# Patient Record
Sex: FEMALE | Race: WHITE | NOT HISPANIC OR LATINO | Employment: FULL TIME | URBAN - METROPOLITAN AREA
[De-identification: names, ages, dates, MRNs, and addresses within clinical notes are randomized per-mention and may not be internally consistent; named-entity substitution may affect disease eponyms.]

---

## 2017-07-30 DIAGNOSIS — R06.02 SOB (SHORTNESS OF BREATH): ICD-10-CM

## 2017-07-31 LAB — EKG IMPRESSION: NORMAL

## 2018-11-28 ENCOUNTER — HOSPITAL ENCOUNTER (EMERGENCY)
Facility: MEDICAL CENTER | Age: 48
End: 2018-11-28
Attending: EMERGENCY MEDICINE
Payer: COMMERCIAL

## 2018-11-28 VITALS
BODY MASS INDEX: 36.88 KG/M2 | RESPIRATION RATE: 18 BRPM | HEIGHT: 65 IN | SYSTOLIC BLOOD PRESSURE: 140 MMHG | DIASTOLIC BLOOD PRESSURE: 102 MMHG | WEIGHT: 221.34 LBS | TEMPERATURE: 98.3 F | OXYGEN SATURATION: 98 % | HEART RATE: 98 BPM

## 2018-11-28 DIAGNOSIS — J98.01 COUGH DUE TO BRONCHOSPASM: ICD-10-CM

## 2018-11-28 DIAGNOSIS — Z76.0 MEDICATION REFILL: ICD-10-CM

## 2018-11-28 DIAGNOSIS — J45.909 UNCOMPLICATED ASTHMA, UNSPECIFIED ASTHMA SEVERITY, UNSPECIFIED WHETHER PERSISTENT: ICD-10-CM

## 2018-11-28 PROCEDURE — 700102 HCHG RX REV CODE 250 W/ 637 OVERRIDE(OP): Performed by: EMERGENCY MEDICINE

## 2018-11-28 PROCEDURE — 700101 HCHG RX REV CODE 250: Performed by: EMERGENCY MEDICINE

## 2018-11-28 PROCEDURE — 99284 EMERGENCY DEPT VISIT MOD MDM: CPT

## 2018-11-28 PROCEDURE — 94640 AIRWAY INHALATION TREATMENT: CPT

## 2018-11-28 PROCEDURE — 94760 N-INVAS EAR/PLS OXIMETRY 1: CPT

## 2018-11-28 PROCEDURE — A9270 NON-COVERED ITEM OR SERVICE: HCPCS | Performed by: EMERGENCY MEDICINE

## 2018-11-28 RX ORDER — LISINOPRIL 10 MG/1
10 TABLET ORAL DAILY
Qty: 7 TAB | Refills: 0 | Status: SHIPPED | OUTPATIENT
Start: 2018-11-28

## 2018-11-28 RX ORDER — ALBUTEROL SULFATE 90 UG/1
2 AEROSOL, METERED RESPIRATORY (INHALATION) EVERY 4 HOURS PRN
Qty: 1 INHALER | Refills: 0 | Status: SHIPPED | OUTPATIENT
Start: 2018-11-28

## 2018-11-28 RX ORDER — LISINOPRIL 10 MG/1
10 TABLET ORAL DAILY
COMMUNITY
End: 2018-11-28

## 2018-11-28 RX ORDER — LISINOPRIL 10 MG/1
10 TABLET ORAL ONCE
Status: COMPLETED | OUTPATIENT
Start: 2018-11-28 | End: 2018-11-28

## 2018-11-28 RX ADMIN — IPRATROPIUM BROMIDE 0.5 MG: 0.5 SOLUTION RESPIRATORY (INHALATION) at 09:45

## 2018-11-28 RX ADMIN — LISINOPRIL 10 MG: 10 TABLET ORAL at 09:45

## 2018-11-28 RX ADMIN — ALBUTEROL SULFATE 2.5 MG: 2.5 SOLUTION RESPIRATORY (INHALATION) at 09:45

## 2018-11-28 ASSESSMENT — ENCOUNTER SYMPTOMS
SHORTNESS OF BREATH: 1
ABDOMINAL PAIN: 0
FEVER: 0
HEADACHES: 0

## 2018-11-28 ASSESSMENT — PAIN SCALES - GENERAL: PAINLEVEL_OUTOF10: 0

## 2018-11-28 NOTE — ED TRIAGE NOTES
49 y/o female ambulatory to triage with c/o sob r/t asthma. Pt states her inhaler ran out last night. Pt is visiting the area for a conference (she will be here for 2 more days) she forgot her antihypertensive meds at home as well.

## 2018-11-28 NOTE — ED NOTES
Explained discharge instructions to patient with all questions answered.  Patient encouraged to follow up with PCP, and return to the ER for worsening symptoms.  VSS upon discharge.  Patient ambulated to lobby with steady gait.  Patient confirmed that she had a safe way to get home.

## 2018-11-28 NOTE — ED PROVIDER NOTES
"ED Provider Note    ED Provider Note    Primary care provider: CARLOS Gama  Means of arrival: POV  History obtained from: Patient  History limited by: None    CHIEF COMPLAINT  Chief Complaint   Patient presents with   • Asthma   • Medication Refill       HPI  Scarlett Murcia is a 48 y.o. female who presents to the Emergency Department with a chief complaint of need for medication refill.  She is been here since Monday from [place] where she lives.  She unfortunately, left her medications at home.  She takes lisinopril and uses an albuterol inhaler daily.  She thought she could \"get by\" but now she is feeling short of breath has some bronchospasm and did not sleep last night because she was worried about her blood pressure being high.  She took a normal medication on Sunday.  She is been without it since Monday.  She otherwise has been in her normal state of health.  No fever.  No pain.    REVIEW OF SYSTEMS  Review of Systems   Constitutional: Negative for fever.   HENT: Negative for congestion.    Respiratory: Positive for shortness of breath.    Cardiovascular: Negative for chest pain.   Gastrointestinal: Negative for abdominal pain.   Neurological: Negative for headaches.   All other systems reviewed and are negative.      PAST MEDICAL HISTORY   has a past medical history of Arthritis; ASTHMA; High cholesterol; Hypertension; and Snoring.    SURGICAL HISTORY   has a past surgical history that includes gastric sleeve laparoscopy (8/5/2016).    SOCIAL HISTORY  Social History   Substance Use Topics   • Smoking status: Former Smoker     Packs/day: 0.50     Years: 10.00     Types: Cigarettes     Quit date: 1/1/2008   • Smokeless tobacco: Never Used   • Alcohol use Yes      Comment: 1-2 daily      History   Drug Use No       FAMILY HISTORY  Family History   Problem Relation Age of Onset   • Family history unknown: Yes       CURRENT MEDICATIONS  Home Medications     Reviewed by Ben Hill R.N. " "(Registered Nurse) on 11/28/18 at 0854  Med List Status: Complete   Medication Last Dose Status   acetaminophen (TYLENOL) 500 MG Tab PRN Active   albuterol (VENTOLIN OR PROVENTIL) 108 (90 BASE) MCG/ACT AERS 11/27/2018 Active   Calcium Carbonate (CALCIUM 600 PO) 11/26/2018 Active   cyanocobalamin (VITAMIN B-12) 500 MCG Tab 11/26/2018 Active   lisinopril (PRINIVIL) 10 MG Tab 11/26/2018 Active   montelukast (SINGULAIR) 10 MG TABS 11/26/2018 Active   Multiple Vitamins-Minerals (ZINC PO) 11/26/2018 Active   Omega-3 Fatty Acids (FISH OIL) 1200 MG Cap 11/26/2018 Active   therapeutic multivitamin-minerals (THERAGRAN-M) Tab 11/26/2018 Active   tramadol (ULTRAM) 50 MG Tab PRN Active   VITAMIN D, CHOLECALCIFEROL, PO 11/26/2018 Active                ALLERGIES  No Known Allergies    PHYSICAL EXAM  VITAL SIGNS: /116   Pulse (!) 120   Temp 36.7 °C (98.1 °F)   Resp 18   Ht 1.651 m (5' 5\")   Wt 100.4 kg (221 lb 5.5 oz)   SpO2 95%   BMI 36.83 kg/m²   Vitals reviewed.  Constitutional: Patient is oriented to person, place, and time. Appears well-developed and well-nourished. No distress.  Somewhat anxious.  Cardiovascular: Tachycardia, regular rhythm and normal heart sounds.   Pulmonary/Chest: Breath sounds normal. No respiratory distress, no wheezes, rhonchi, or rales.  Mild increased work of breathing.  Cough due to bronchospasm.  Musculoskeletal: No edema and no tenderness.   Skin: Skin is warm and dry. No erythema. No pallor.   Psychiatric: Patient has a normal mood and affect.     COURSE & MEDICAL DECISION MAKING  Nursing notes, VS, PMSFHx reviewed in chart.    Obtained and reviewed past medical records.  Last ED visit in 2013 for right-sided pain.  She also underwent gastric bypass surgery with Dr. Cornelius in 2016.    9:19 AM - Patient seen and examined at bedside.  This is a pleasant, 48-year-old female who presents, visiting from out of town with chief complaint of need for medication refill.  She does have some very " mild increased work of breathing.  She is tachycardic.  She is somewhat worried about being without her medication.  To be treated with a nebulizer therapy here in the ED.  We will give her her daily dose of lisinopril and provide medication refills until she is home.     10:19 AM patient is feeling much better.  Heart rate reevaluated in the 90s. Checked by myself.  She is eager to go home.  To be discharged home with a short course of lisinopril and prescription for albuterol MDI until she gets home.  She is well-appearing and nontoxic.  To be discharged home in stable condition.      DISPOSITION:  Patient will be discharged home in stable condition.    FOLLOW UP:  [facility], Emergency Dept  [address] [facility] [address] [phone]        CARLOS Gama  [address] [facility] [address]  [phone]    In 1 week        OUTPATIENT MEDICATIONS:  New Prescriptions    ALBUTEROL 108 (90 BASE) MCG/ACT AERO SOLN INHALATION AEROSOL    Inhale 2 Puffs by mouth every four hours as needed.    LISINOPRIL (PRINIVIL) 10 MG TAB    Take 1 Tab by mouth every day.         FINAL IMPRESSION  1. Medication refill    2. Cough due to bronchospasm    3. Uncomplicated asthma, unspecified asthma severity, unspecified whether persistent

## 2018-11-29 ENCOUNTER — PATIENT OUTREACH (OUTPATIENT)
Dept: HEALTH INFORMATION MANAGEMENT | Facility: OTHER | Age: 48
End: 2018-11-29